# Patient Record
Sex: MALE | URBAN - METROPOLITAN AREA
[De-identification: names, ages, dates, MRNs, and addresses within clinical notes are randomized per-mention and may not be internally consistent; named-entity substitution may affect disease eponyms.]

---

## 2022-10-06 ENCOUNTER — OFFICE VISIT (OUTPATIENT)
Dept: URBAN - METROPOLITAN AREA CLINIC 107 | Facility: CLINIC | Age: 72
End: 2022-10-06
Payer: COMMERCIAL

## 2022-10-06 ENCOUNTER — DASHBOARD ENCOUNTERS (OUTPATIENT)
Age: 72
End: 2022-10-06

## 2022-10-06 VITALS
BODY MASS INDEX: 30.21 KG/M2 | HEIGHT: 69 IN | WEIGHT: 204 LBS | HEART RATE: 56 BPM | DIASTOLIC BLOOD PRESSURE: 69 MMHG | TEMPERATURE: 98.2 F | SYSTOLIC BLOOD PRESSURE: 145 MMHG

## 2022-10-06 DIAGNOSIS — K21.9 GASTROESOPHAGEAL REFLUX DISEASE, UNSPECIFIED WHETHER ESOPHAGITIS PRESENT: ICD-10-CM

## 2022-10-06 DIAGNOSIS — Z12.11 COLON CANCER SCREENING: ICD-10-CM

## 2022-10-06 DIAGNOSIS — R07.2 RETROSTERNAL CHEST PAIN: ICD-10-CM

## 2022-10-06 PROBLEM — 235595009: Status: ACTIVE | Noted: 2022-10-06

## 2022-10-06 PROCEDURE — 99203 OFFICE O/P NEW LOW 30 MIN: CPT | Performed by: STUDENT IN AN ORGANIZED HEALTH CARE EDUCATION/TRAINING PROGRAM

## 2022-10-06 NOTE — HPI-TODAY'S VISIT:
Mr. Russell is a 72-year-old male who was referred to the GI clinic by Dr. Au for evaluation of reflux. . Patient complains of epigastric pain/burning radiating into retrosternum and back of throat. This is mainly with activities such as leaning forward however also happens during exercise. He denies a clear association with meals. Symptoms can last for a few seconds, improved with rest. He tried PPI for one week without relief. He does not take any prescription medications now. He denies fevers/chills, nausea/vomiting, diarrhea constipation, rectal bleeding. He has lost 4-6 pounds over the last 6 months. He has never had EGD or colonoscopy. . He does admit to eating tomato-based foods although he does not note a clear correlation to his substernal burning and food ingestion. He has never had a cardiac evaluation.

## 2022-10-18 ENCOUNTER — LAB OUTSIDE AN ENCOUNTER (OUTPATIENT)
Dept: URBAN - METROPOLITAN AREA CLINIC 113 | Facility: CLINIC | Age: 72
End: 2022-10-18

## 2022-10-23 LAB — H. PYLORI STOOL AG, EIA: NEGATIVE
